# Patient Record
Sex: FEMALE | Race: WHITE | NOT HISPANIC OR LATINO | Employment: PART TIME | URBAN - METROPOLITAN AREA
[De-identification: names, ages, dates, MRNs, and addresses within clinical notes are randomized per-mention and may not be internally consistent; named-entity substitution may affect disease eponyms.]

---

## 2024-02-12 ENCOUNTER — HOSPITAL ENCOUNTER (EMERGENCY)
Facility: CLINIC | Age: 19
Discharge: HOME | End: 2024-02-12
Attending: PHYSICIAN ASSISTANT | Admitting: PHYSICIAN ASSISTANT
Payer: COMMERCIAL

## 2024-02-12 VITALS
HEART RATE: 90 BPM | SYSTOLIC BLOOD PRESSURE: 112 MMHG | WEIGHT: 108.6 LBS | RESPIRATION RATE: 16 BRPM | OXYGEN SATURATION: 99 % | TEMPERATURE: 98 F | DIASTOLIC BLOOD PRESSURE: 69 MMHG

## 2024-02-12 DIAGNOSIS — F41.9 ANXIETY DISORDER: ICD-10-CM

## 2024-02-12 PROBLEM — F41.0 PANIC DISORDER WITHOUT AGORAPHOBIA: Status: ACTIVE | Noted: 2024-02-12

## 2024-02-12 LAB
ALBUMIN SERPL BCG-MCNC: 4.9 G/DL (ref 3.5–5.2)
ALP SERPL-CCNC: 75 U/L (ref 40–150)
ALT SERPL W P-5'-P-CCNC: 11 U/L (ref 0–50)
ANION GAP SERPL CALCULATED.3IONS-SCNC: 14 MMOL/L (ref 7–15)
AST SERPL W P-5'-P-CCNC: 14 U/L (ref 0–35)
BASOPHILS # BLD AUTO: 0.1 10E3/UL (ref 0–0.2)
BASOPHILS NFR BLD AUTO: 1 %
BILIRUB SERPL-MCNC: 0.4 MG/DL
BUN SERPL-MCNC: 11.3 MG/DL (ref 6–20)
CALCIUM SERPL-MCNC: 9.6 MG/DL (ref 8.6–10)
CHLORIDE SERPL-SCNC: 103 MMOL/L (ref 98–107)
CREAT SERPL-MCNC: 0.81 MG/DL (ref 0.51–0.95)
DEPRECATED HCO3 PLAS-SCNC: 21 MMOL/L (ref 22–29)
EGFRCR SERPLBLD CKD-EPI 2021: >90 ML/MIN/1.73M2
EOSINOPHIL # BLD AUTO: 0.1 10E3/UL (ref 0–0.7)
EOSINOPHIL NFR BLD AUTO: 1 %
ERYTHROCYTE [DISTWIDTH] IN BLOOD BY AUTOMATED COUNT: 11.9 % (ref 10–15)
GLUCOSE SERPL-MCNC: 102 MG/DL (ref 70–99)
HCT VFR BLD AUTO: 38.8 % (ref 35–47)
HGB BLD-MCNC: 13.6 G/DL (ref 11.7–15.7)
IMM GRANULOCYTES # BLD: 0 10E3/UL
IMM GRANULOCYTES NFR BLD: 0 %
LYMPHOCYTES # BLD AUTO: 2.1 10E3/UL (ref 0.8–5.3)
LYMPHOCYTES NFR BLD AUTO: 23 %
MCH RBC QN AUTO: 30.3 PG (ref 26.5–33)
MCHC RBC AUTO-ENTMCNC: 35.1 G/DL (ref 31.5–36.5)
MCV RBC AUTO: 86 FL (ref 78–100)
MONOCYTES # BLD AUTO: 0.5 10E3/UL (ref 0–1.3)
MONOCYTES NFR BLD AUTO: 6 %
NEUTROPHILS # BLD AUTO: 6.2 10E3/UL (ref 1.6–8.3)
NEUTROPHILS NFR BLD AUTO: 69 %
NRBC # BLD AUTO: 0 10E3/UL
NRBC BLD AUTO-RTO: 0 /100
PLATELET # BLD AUTO: 346 10E3/UL (ref 150–450)
POTASSIUM SERPL-SCNC: 4.1 MMOL/L (ref 3.4–5.3)
PROT SERPL-MCNC: 7.8 G/DL (ref 6.4–8.3)
RBC # BLD AUTO: 4.49 10E6/UL (ref 3.8–5.2)
SODIUM SERPL-SCNC: 138 MMOL/L (ref 135–145)
TSH SERPL DL<=0.005 MIU/L-ACNC: 1.14 UIU/ML (ref 0.5–4.3)
WBC # BLD AUTO: 8.9 10E3/UL (ref 4–11)

## 2024-02-12 PROCEDURE — 250N000013 HC RX MED GY IP 250 OP 250 PS 637: Performed by: PHYSICIAN ASSISTANT

## 2024-02-12 PROCEDURE — 99284 EMERGENCY DEPT VISIT MOD MDM: CPT | Performed by: PHYSICIAN ASSISTANT

## 2024-02-12 PROCEDURE — 99283 EMERGENCY DEPT VISIT LOW MDM: CPT | Performed by: PHYSICIAN ASSISTANT

## 2024-02-12 PROCEDURE — 85004 AUTOMATED DIFF WBC COUNT: CPT | Performed by: PHYSICIAN ASSISTANT

## 2024-02-12 PROCEDURE — 82040 ASSAY OF SERUM ALBUMIN: CPT | Performed by: PHYSICIAN ASSISTANT

## 2024-02-12 PROCEDURE — 36415 COLL VENOUS BLD VENIPUNCTURE: CPT | Performed by: PHYSICIAN ASSISTANT

## 2024-02-12 PROCEDURE — 84443 ASSAY THYROID STIM HORMONE: CPT | Performed by: PHYSICIAN ASSISTANT

## 2024-02-12 RX ORDER — LORAZEPAM 0.5 MG/1
0.5 TABLET ORAL ONCE
Status: COMPLETED | OUTPATIENT
Start: 2024-02-12 | End: 2024-02-12

## 2024-02-12 RX ORDER — LORAZEPAM 1 MG/1
.5-1 TABLET ORAL EVERY 8 HOURS PRN
Qty: 5 TABLET | Refills: 0 | Status: SHIPPED | OUTPATIENT
Start: 2024-02-12

## 2024-02-12 RX ADMIN — LORAZEPAM 0.5 MG: 0.5 TABLET ORAL at 17:30

## 2024-02-12 ASSESSMENT — ENCOUNTER SYMPTOMS
CONSTITUTIONAL NEGATIVE: 1
ENDOCRINE NEGATIVE: 1
NAUSEA: 1
EYES NEGATIVE: 1
ABDOMINAL PAIN: 0
HEMATOLOGIC/LYMPHATIC NEGATIVE: 1
NERVOUS/ANXIOUS: 1
ALLERGIC/IMMUNOLOGIC NEGATIVE: 1
NEUROLOGICAL NEGATIVE: 1
VOMITING: 0
RESPIRATORY NEGATIVE: 1
PALPITATIONS: 0
MUSCULOSKELETAL NEGATIVE: 1

## 2024-02-12 ASSESSMENT — ACTIVITIES OF DAILY LIVING (ADL)
ADLS_ACUITY_SCORE: 35
ADLS_ACUITY_SCORE: 33

## 2024-02-12 NOTE — ED TRIAGE NOTES
Patient been having severe anxiety and been put on medications but doeesn't seem to be working.
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
## 2024-02-12 NOTE — ED PROVIDER NOTES
History     Chief Complaint   Patient presents with    Anxiety     HPI  Amita Arredondo is a 18 year old female who presents for anxiety attacks that have been occurring in the past two weeks. She notes that she was prescribed Wellbutrin and hydroxyzine 2 week ago in primary care and has not found them to be effective. She is accompanied by her fiance, Ted, who provides some history. Amita notes that she has been having daily panic attacks, mostly in the evening. Her fiance notes that she will be very tearful and inconsolable at times. Amita noted that one time she was so worked up she had passively mentioned suicide as a way to escape the panic. She notes that she has no thought of harming herself or others. She reports her anxiety to be 8/10 with 10 being the worst. She notes that her anxiety has worsened while planning her wedding and is looking to have control of her attacks. She denied the use of drugs and alcohol.     Allergies:  No Known Allergies    Problem List:    Patient Active Problem List    Diagnosis Date Noted    Panic disorder without agoraphobia 02/12/2024     Priority: Medium        Past Medical History:    No past medical history on file.    Past Surgical History:    No past surgical history on file.    Family History:    No family history on file.    Social History:  Marital Status:  Single [1]        Medications:    LORazepam (ATIVAN) 1 MG tablet          Review of Systems   Constitutional: Negative.    HENT: Negative.     Eyes: Negative.    Respiratory: Negative.     Cardiovascular:  Positive for chest pain. Negative for palpitations and leg swelling.   Gastrointestinal:  Positive for nausea. Negative for abdominal pain and vomiting.   Endocrine: Negative.    Genitourinary: Negative.    Musculoskeletal: Negative.    Skin: Negative.    Allergic/Immunologic: Negative.    Neurological: Negative.    Hematological: Negative.    Psychiatric/Behavioral:  The patient is nervous/anxious.         Physical Exam   BP: 136/88  Pulse: 87  Temp: 98  F (36.7  C)  Resp: 18  Weight: 49.3 kg (108 lb 9.6 oz)  SpO2: 96 %      Physical Exam  Vitals and nursing note reviewed.   Constitutional:       General: She is not in acute distress.     Appearance: Normal appearance. She is not diaphoretic.      Comments: Well groomed, quiet and comfortable sitting in bed   HENT:      Head: Normocephalic and atraumatic.      Right Ear: Tympanic membrane and external ear normal.      Left Ear: Tympanic membrane and external ear normal.      Nose: Nose normal. No congestion or rhinorrhea.      Mouth/Throat:      Pharynx: No oropharyngeal exudate.   Eyes:      General: No scleral icterus.        Right eye: No discharge.         Left eye: No discharge.      Extraocular Movements: Extraocular movements intact.      Conjunctiva/sclera: Conjunctivae normal.      Pupils: Pupils are equal, round, and reactive to light.   Neck:      Thyroid: No thyromegaly.   Cardiovascular:      Rate and Rhythm: Normal rate and regular rhythm.      Heart sounds: Normal heart sounds. No murmur heard.  Pulmonary:      Effort: Pulmonary effort is normal. No respiratory distress.      Breath sounds: Normal breath sounds. No wheezing or rales.   Chest:      Chest wall: No tenderness.   Abdominal:      General: Bowel sounds are normal. There is no distension.      Palpations: Abdomen is soft. There is no mass.      Tenderness: There is no abdominal tenderness. There is no guarding or rebound.   Musculoskeletal:         General: No tenderness or deformity. Normal range of motion.      Cervical back: Normal range of motion and neck supple.   Lymphadenopathy:      Cervical: No cervical adenopathy.   Skin:     General: Skin is warm and dry.      Capillary Refill: Capillary refill takes less than 2 seconds.      Coloration: Skin is not jaundiced or pale.      Findings: No bruising, erythema, lesion or rash.   Neurological:      General: No focal deficit present.       Mental Status: She is alert and oriented to person, place, and time.      Cranial Nerves: No cranial nerve deficit.      Sensory: No sensory deficit.      Motor: No weakness.      Coordination: Coordination normal.      Gait: Gait normal.      Deep Tendon Reflexes: Reflexes normal.   Psychiatric:         Attention and Perception: Attention and perception normal.         Mood and Affect: Mood is anxious. Mood is not depressed or elated. Affect is not labile, blunt, flat, angry, tearful or inappropriate.         Speech: Speech normal.         Behavior: Behavior normal.         Thought Content: Thought content normal. Thought content is not paranoid or delusional. Thought content does not include homicidal or suicidal ideation. Thought content does not include homicidal or suicidal plan.         Cognition and Memory: Cognition and memory normal.         Judgment: Judgment normal.      Comments: Quiet, anxious         ED Course                 Procedures              Critical Care time:  none               Results for orders placed or performed during the hospital encounter of 02/12/24 (from the past 24 hour(s))   CBC with platelets differential    Narrative    The following orders were created for panel order CBC with platelets differential.  Procedure                               Abnormality         Status                     ---------                               -----------         ------                     CBC with platelets and d...[[id number]]                      Final result                 Please view results for these tests on the individual orders.   Comprehensive metabolic panel   Result Value Ref Range    Sodium 138 135 - 145 mmol/L    Potassium 4.1 3.4 - 5.3 mmol/L    Carbon Dioxide (CO2) 21 (L) 22 - 29 mmol/L    Anion Gap 14 7 - 15 mmol/L    Urea Nitrogen 11.3 6.0 - 20.0 mg/dL    Creatinine 0.81 0.51 - 0.95 mg/dL    GFR Estimate >90 >60 mL/min/1.73m2    Calcium 9.6 8.6 - 10.0 mg/dL    Chloride 103  98 - 107 mmol/L    Glucose 102 (H) 70 - 99 mg/dL    Alkaline Phosphatase 75 40 - 150 U/L    AST 14 0 - 35 U/L    ALT 11 0 - 50 U/L    Protein Total 7.8 6.4 - 8.3 g/dL    Albumin 4.9 3.5 - 5.2 g/dL    Bilirubin Total 0.4 <=1.2 mg/dL   TSH with free T4 reflex   Result Value Ref Range    TSH 1.14 0.50 - 4.30 uIU/mL   CBC with platelets and differential   Result Value Ref Range    WBC Count 8.9 4.0 - 11.0 10e3/uL    RBC Count 4.49 3.80 - 5.20 10e6/uL    Hemoglobin 13.6 11.7 - 15.7 g/dL    Hematocrit 38.8 35.0 - 47.0 %    MCV 86 78 - 100 fL    MCH 30.3 26.5 - 33.0 pg    MCHC 35.1 31.5 - 36.5 g/dL    RDW 11.9 10.0 - 15.0 %    Platelet Count 346 150 - 450 10e3/uL    % Neutrophils 69 %    % Lymphocytes 23 %    % Monocytes 6 %    % Eosinophils 1 %    % Basophils 1 %    % Immature Granulocytes 0 %    NRBCs per 100 WBC 0 <1 /100    Absolute Neutrophils 6.2 1.6 - 8.3 10e3/uL    Absolute Lymphocytes 2.1 0.8 - 5.3 10e3/uL    Absolute Monocytes 0.5 0.0 - 1.3 10e3/uL    Absolute Eosinophils 0.1 0.0 - 0.7 10e3/uL    Absolute Basophils 0.1 0.0 - 0.2 10e3/uL    Absolute Immature Granulocytes 0.0 <=0.4 10e3/uL    Absolute NRBCs 0.0 10e3/uL       Medications   LORazepam (ATIVAN) tablet 0.5 mg (0.5 mg Oral $Given 2/12/24 7568)       Assessments & Plan (with Medical Decision Making)  Anxiety disorder     18 year old female presents with her fiancé for evaluation of daily anxiety that builds to the point of panic.  She denies feeling depressed.  No suicidal ideation or plans currently.  She does feel safe.  Working with her  for counseling.  Was seen by primary care and placed on Wellbutrin and hydroxyzine 1 week ago and has not noticed improvement.  She does feel safe at home.  On exam vital signs are normal.  Patient does appear to be anxious.  She does not have any paranoia or delusional thought process.  No homicidal or suicidal thoughts.  No intent or plan.  Normal judgment and normal speech.  Normal attention.  Physical exam is  otherwise negative.  Laboratory levels obtained with comprehensive metabolic panel, TSH, and CBC without acute abnormality.  We had her work with the [facility] .  They confirmed that she is safe to return home and does not appear to be a threat to herself or others at this time.  They were able to set her up with a psychiatrist for evaluation and 1.5 days.  The [facility]  tried to get the patient set up with counseling, but she was adamant that she wanted to continue with her  and not do secular counseling at this time.  I spoke with the patient and her fiancé about this as well and encouraged them to reconsider, but they still do not want to consider further counseling currently.  Strict ED return instructions reviewed with them in detail.  I did give her a dose of Ativan for anxiety management here in the ED and I provided her a small prescription of number 5 pills to use for breakthrough symptoms.  Possible side effects discussed.  No driving for 8 hours after taking the medication.  This cannot be taken on a regular basis and is only a breakthrough medication.  I will leave any further medication management up to the discretion of her psychiatry consultation.  The patient was in agreement with this plan and was suitable for discharge.     I have reviewed the nursing notes.    I have reviewed the findings, diagnosis, plan and need for follow up with the patient.           Medical Decision Making  The patient's presentation was of moderate complexity (an acute illness with systemic symptoms).    The patient's evaluation involved:  ordering and/or review of 3+ test(s) in this encounter (see separate area of note for details)  discussion of management or test interpretation with another health professional (see separate area of note for details)    The patient's management necessitated moderate risk (prescription drug management including medications given in the ED).        Discharge Medication List as of  2/12/2024  5:25 PM        START taking these medications    Details   LORazepam (ATIVAN) 1 MG tablet Take 0.5-1 tablets (0.5-1 mg) by mouth every 8 hours as needed for anxiety, Disp-5 tablet, R-0, E-Prescribe             Final diagnoses:   Anxiety disorder     Patient was seen and examined by myself and Omar Abraham PA-C. The note was then documented by me.   Argentina Estes, MS3         2/12/2024   [facility] EMERGENCY DEPT       Omar Abraham PA-C  02/12/24 7163

## 2024-02-12 NOTE — DISCHARGE INSTRUCTIONS
It was a pleasure working with you today!  I hope your condition improves as you work through this!    Continue your regular medications.  You can use the Ativan for breakthrough symptoms.  Do not drive for 8 hours after taking Ativan, as it can impair your judgment.    You have been scheduled for tele-psychiatry with:    Guadalupe Salinas  [address]  [address]  [place]  [phone]  Call to confirm your appointment at least 24 hours before your scheduled appointment time    Follow up with therapist- Mic[facility] Crisis Response Team- Available 24/7- [phone]

## 2024-10-17 ENCOUNTER — APPOINTMENT (OUTPATIENT)
Dept: ULTRASOUND IMAGING | Facility: CLINIC | Age: 19
End: 2024-10-17
Attending: FAMILY MEDICINE

## 2024-10-17 ENCOUNTER — HOSPITAL ENCOUNTER (EMERGENCY)
Facility: CLINIC | Age: 19
Discharge: HOME | End: 2024-10-17
Attending: FAMILY MEDICINE | Admitting: FAMILY MEDICINE

## 2024-10-17 VITALS
OXYGEN SATURATION: 98 % | HEART RATE: 67 BPM | DIASTOLIC BLOOD PRESSURE: 64 MMHG | WEIGHT: 120 LBS | SYSTOLIC BLOOD PRESSURE: 116 MMHG | TEMPERATURE: 98.8 F | RESPIRATION RATE: 16 BRPM

## 2024-10-17 DIAGNOSIS — O20.9 VAGINAL BLEEDING IN PREGNANCY, FIRST TRIMESTER: ICD-10-CM

## 2024-10-17 DIAGNOSIS — O03.9 MISCARRIAGE: ICD-10-CM

## 2024-10-17 LAB
ABO/RH(D): NORMAL
ANTIBODY SCREEN: NEGATIVE
BASOPHILS # BLD AUTO: 0.1 10E3/UL (ref 0–0.2)
BASOPHILS NFR BLD AUTO: 1 %
EOSINOPHIL # BLD AUTO: 0.2 10E3/UL (ref 0–0.7)
EOSINOPHIL NFR BLD AUTO: 2 %
ERYTHROCYTE [DISTWIDTH] IN BLOOD BY AUTOMATED COUNT: 11.7 % (ref 10–15)
HCG INTACT+B SERPL-ACNC: 3872 MIU/ML
HCT VFR BLD AUTO: 36.6 % (ref 35–47)
HGB BLD-MCNC: 12.9 G/DL (ref 11.7–15.7)
IMM GRANULOCYTES # BLD: 0 10E3/UL
IMM GRANULOCYTES NFR BLD: 0 %
LYMPHOCYTES # BLD AUTO: 4.7 10E3/UL (ref 0.8–5.3)
LYMPHOCYTES NFR BLD AUTO: 54 %
MCH RBC QN AUTO: 29.7 PG (ref 26.5–33)
MCHC RBC AUTO-ENTMCNC: 35.2 G/DL (ref 31.5–36.5)
MCV RBC AUTO: 84 FL (ref 78–100)
MONOCYTES # BLD AUTO: 0.6 10E3/UL (ref 0–1.3)
MONOCYTES NFR BLD AUTO: 7 %
NEUTROPHILS # BLD AUTO: 3.1 10E3/UL (ref 1.6–8.3)
NEUTROPHILS NFR BLD AUTO: 35 %
NRBC # BLD AUTO: 0 10E3/UL
NRBC BLD AUTO-RTO: 0 /100
PLATELET # BLD AUTO: 253 10E3/UL (ref 150–450)
RBC # BLD AUTO: 4.35 10E6/UL (ref 3.8–5.2)
SPECIMEN EXPIRATION DATE: NORMAL
WBC # BLD AUTO: 8.7 10E3/UL (ref 4–11)

## 2024-10-17 PROCEDURE — 36415 COLL VENOUS BLD VENIPUNCTURE: CPT | Performed by: FAMILY MEDICINE

## 2024-10-17 PROCEDURE — 86900 BLOOD TYPING SEROLOGIC ABO: CPT | Performed by: FAMILY MEDICINE

## 2024-10-17 PROCEDURE — 85004 AUTOMATED DIFF WBC COUNT: CPT | Performed by: FAMILY MEDICINE

## 2024-10-17 PROCEDURE — 76801 OB US < 14 WKS SINGLE FETUS: CPT

## 2024-10-17 PROCEDURE — 84702 CHORIONIC GONADOTROPIN TEST: CPT | Performed by: FAMILY MEDICINE

## 2024-10-17 PROCEDURE — 99284 EMERGENCY DEPT VISIT MOD MDM: CPT | Mod: 25 | Performed by: FAMILY MEDICINE

## 2024-10-17 PROCEDURE — 86901 BLOOD TYPING SEROLOGIC RH(D): CPT | Performed by: FAMILY MEDICINE

## 2024-10-17 PROCEDURE — 99284 EMERGENCY DEPT VISIT MOD MDM: CPT | Performed by: FAMILY MEDICINE

## 2024-10-17 ASSESSMENT — COLUMBIA-SUICIDE SEVERITY RATING SCALE - C-SSRS
1. IN THE PAST MONTH, HAVE YOU WISHED YOU WERE DEAD OR WISHED YOU COULD GO TO SLEEP AND NOT WAKE UP?: NO
6. HAVE YOU EVER DONE ANYTHING, STARTED TO DO ANYTHING, OR PREPARED TO DO ANYTHING TO END YOUR LIFE?: NO
2. HAVE YOU ACTUALLY HAD ANY THOUGHTS OF KILLING YOURSELF IN THE PAST MONTH?: NO

## 2024-10-17 ASSESSMENT — ACTIVITIES OF DAILY LIVING (ADL)
ADLS_ACUITY_SCORE: 35
ADLS_ACUITY_SCORE: 33

## 2024-10-17 NOTE — DISCHARGE INSTRUCTIONS
We are sorry that you are miscarrying.  Please see the attached handout.  We do not detect a fetal heartbeat.  This may have occurred a week ago.  You will likely miscarry on your own in the next 1 to 2 days.  Take Tylenol/ibuprofen as needed for pain.  Rest and drink plenty of fluids.  Follow-up in the clinic next week if you are still having any bleeding or cramping as you may need a follow-up ultrasound.  Return to the emergency department at any time if your symptoms worsen.

## 2024-10-17 NOTE — ED TRIAGE NOTES
Pt woke up to pelvic cramping and bleeding  Pt is 8 weeks pregnant with first child  Bright red blood   Hurts on mostly left pelvic and bilateral lower back  Has not taken any medication for pain     Triage Assessment (Adult)       Row Name 10/17/24 0957          Triage Assessment    Airway WDL WDL        Respiratory WDL    Respiratory WDL WDL        Cognitive/Neuro/Behavioral WDL    Cognitive/Neuro/Behavioral WDL WDL

## 2024-10-17 NOTE — ED NOTES
Pt states she woke up with abdominal cramping and bleeding this morning. Blood is bright red and has not soaked through a pad yet. Small blood  clot in toilet when used bathroom this morning.

## 2024-10-17 NOTE — ED PROVIDER NOTES
[facility] ED Provider Note   Patient: Amita Arredondo  MRN #:  [id number]  Date of Visit: October 17, 2024    CC:     Chief Complaint   Patient presents with    Vaginal Bleeding - Pregnant     HPI:  Amita Arredondo is a 19 year old female G1, P0 at approximately 8 weeks gestation based on LMP who presented to the emergency department with acute onset of vaginal bleeding and pelvic cramping that started overnight.  Patient noticed this morning that she had some bleeding in her underwear and into the toilet.  This was bright red color.  She and her  had intercourse last night.  Patient reports some discomfort more on the left side of the pelvic area than on the right.  She denies any significant health problems but has an anxiety disorder.  She is not on any current medications and is taking prenatal vitamins irregularly.    Problem List:  Patient Active Problem List    Diagnosis Date Noted    Panic disorder without agoraphobia 02/12/2024     Priority: Medium       No past medical history on file.    MEDS: LORazepam (ATIVAN) 1 MG tablet        ALLERGIES:  No Known Allergies    No past surgical history on file.           Review of Systems   Except as noted in HPI, all other systems were reviewed and are negative    Physical Exam   Vitals were reviewed  Patient Vitals for the past 8 hrs:   BP Temp Temp src Pulse Resp SpO2 Weight   10/17/24 0955 116/64 98.8  F (37.1  C) Oral 67 16 98 % 54.4 kg (120 lb)     GENERAL APPEARANCE: Alert and oriented x 3, no acute distress  FACE: normal facies  EYES: Pupils are equal  HENT: normal external exam  RESP: normal respiratory effort; clear breath sounds bilaterally  CV: regular rate and rhythm; no significant murmurs, gallops or rubs  ABD: soft, no tenderness; no rebound or guarding; bowel sounds are normal  SKIN: no worrisome rash  NEURO: no facial droop; no focal deficits, speech is  normal        Available Lab/Imaging Results     Results for orders placed or performed during the hospital encounter of 10/17/24 (from the past 24 hour(s))   HCG quantitative pregnancy (blood)   Result Value Ref Range    hCG Quantitative 3,872 (H) <5 mIU/mL   ABO/Rh type and screen    Narrative    The following orders were created for panel order ABO/Rh type and screen.  Procedure                               Abnormality         Status                     ---------                               -----------         ------                     Adult Type and Screen[[id number]]                            Final result                 Please view results for these tests on the individual orders.   CBC with platelets differential    Narrative    The following orders were created for panel order CBC with platelets differential.  Procedure                               Abnormality         Status                     ---------                               -----------         ------                     CBC with platelets and d...[[id number]]                      Final result                 Please view results for these tests on the individual orders.   Adult Type and Screen   Result Value Ref Range    ABO/RH(D) O POS     Antibody Screen Negative Negative    SPECIMEN EXPIRATION DATE [id number]    CBC with platelets and differential   Result Value Ref Range    WBC Count 8.7 4.0 - 11.0 10e3/uL    RBC Count 4.35 3.80 - 5.20 10e6/uL    Hemoglobin 12.9 11.7 - 15.7 g/dL    Hematocrit 36.6 35.0 - 47.0 %    MCV 84 78 - 100 fL    MCH 29.7 26.5 - 33.0 pg    MCHC 35.2 31.5 - 36.5 g/dL    RDW 11.7 10.0 - 15.0 %    Platelet Count 253 150 - 450 10e3/uL    % Neutrophils 35 %    % Lymphocytes 54 %    % Monocytes 7 %    % Eosinophils 2 %    % Basophils 1 %    % Immature Granulocytes 0 %    NRBCs per 100 WBC 0 <1 /100    Absolute Neutrophils 3.1 1.6 - 8.3 10e3/uL    Absolute Lymphocytes 4.7 0.8 - 5.3 10e3/uL    Absolute Monocytes 0.6 0.0 -  1.3 10e3/uL    Absolute Eosinophils 0.2 0.0 - 0.7 10e3/uL    Absolute Basophils 0.1 0.0 - 0.2 10e3/uL    Absolute Immature Granulocytes 0.0 <=0.4 10e3/uL    Absolute NRBCs 0.0 10e3/uL   US OB < 14 Weeks w Transvaginal    Narrative    ULTRASOUND OBSTETRIC <14 WEEKS WITH TRANSVAGINAL SINGLE October 17, 2024 11:06 AM    HISTORY: Threatened miscarriage.    COMPARISON: None.    FINDINGS: Intrauterine gestational sac with fetal pole and yolk sac  identified. No detectable fetal cardiac activity. Gestational age by  crown-rump length is 6 weeks 6 days. No acute hematoma identified.    Maternal adnexa: Unremarkable right and left ovaries.    Patient reported LMP: 8/23/2024.  LMP gestational age: 7 weeks 6 days.      Impression    IMPRESSION: Intrauterine gestational sac with fetal pole identified.  However, no detectable fetal cardiac activity. This is worrisome for  fetal demise. Recommend confirmation with serial beta hCG assessment  and repeat imaging as needed.                Impression     Final diagnoses:   Vaginal bleeding in pregnancy, first trimester   Miscarriage         ED Course & Medical Decision Making   Amita Arredondo is a 19 year old female G1, P0 at 8 weeks gestation based on LMP who presented to the emergency department with pelvic cramping and vaginal bleeding/spotting.  Symptoms started overnight, and continued this morning.  She had intercourse with her  last night.    Vital signs reveal a temp of 98.8, blood pressure 116/64, heart rate of 67, respiration 16, 98% oxygen saturation.  On exam, patient has nontender abdomen, no rebound or guarding.  Lung and heart exam is normal.  No CVA tenderness.    Patient's workup reveals O+ blood type.  Quantitative pregnancy level is 3872.  CBC and hemoglobin are normal.  OB ultrasound reveals intrauterine gestational sac with fetal pole identified.  However no detectable fetal cardiac rhythm.  This is worrisome for fetal demise.  Patient and her   were informed of these findings.  It is likely that she has miscarried and will complete her miscarriage in the next several days.  Anticipatory guidance was given.  See discharge instructions below.          Written after-visit summary and instructions were given at the time of discharge.        Discharge Instructions:   We are sorry that you are miscarrying.  Please see the attached handout.  We do not detect a fetal heartbeat.  This may have occurred a week ago.  You will likely miscarry on your own in the next 1 to 2 days.  Take Tylenol/ibuprofen as needed for pain.  Rest and drink plenty of fluids.  Follow-up in the clinic next week if you are still having any bleeding or cramping as you may need a follow-up ultrasound.  Return to the emergency department at any time if your symptoms worsen.       Disclaimer: This note consists of words and symbols derived from keyboarding and dictation using voice recognition software.  As a result, there may be errors that have gone undetected.  Please consider this when interpreting information found in this note.       Travon Spaulding MD  10/17/24 1122